# Patient Record
Sex: FEMALE
[De-identification: names, ages, dates, MRNs, and addresses within clinical notes are randomized per-mention and may not be internally consistent; named-entity substitution may affect disease eponyms.]

---

## 2024-09-10 PROBLEM — Z00.00 ENCOUNTER FOR PREVENTIVE HEALTH EXAMINATION: Status: ACTIVE | Noted: 2024-09-10

## 2024-09-13 ENCOUNTER — APPOINTMENT (OUTPATIENT)
Dept: ORTHOPEDIC SURGERY | Facility: CLINIC | Age: 64
End: 2024-09-13
Payer: MEDICARE

## 2024-09-13 DIAGNOSIS — M25.531 PAIN IN RIGHT WRIST: ICD-10-CM

## 2024-09-13 DIAGNOSIS — M25.532 PAIN IN RIGHT WRIST: ICD-10-CM

## 2024-09-13 PROCEDURE — 99204 OFFICE O/P NEW MOD 45 MIN: CPT

## 2024-09-13 NOTE — ASSESSMENT
[FreeTextEntry1] : My impression is that the patient has bilateral thumb basal joint arthritis. Initial treatment options were discussed. I explained that the condition is not curative and that initial treatment his pain to help control her symptoms. I discussed activity modification, diminishing activities that exacerbate her symptoms (i.e. opening tight jars and bottle caps), anti-inflammatory medication/ointment, thumb spica splint wear and corticosteroid injection. The patient refused to undergo a steroid shot today and refused all nonsurgical options, requesting and demanding surgical intervention.  I had a lengthy discussion with her explaining that this is not the proper route in regards to treating this condition, however, she disagreed.  The patient also refused an occupational therapy prescription, which I recommended.  She will plan to follow-up with a different provider given I cannot recommend surgical intervention at this time.

## 2024-09-13 NOTE — PHYSICAL EXAM
[de-identified] : Physical exam demonstrates the patient to be alert and oriented x 3 and capable of ambulation only with a rolling walker. The patient is well-developed and well-nourished in no apparent respiratory distress. The majority of the skin is intact bilaterally in the upper extremities without any bilateral elbow lymphadenopathy.  Evaluation of both elbows reveals full symmetric range of motion from full extension to 140 of flexion with full pronation and full supination.   The wrists have symmetric range of motion bilaterally. Tender over the thumb CMC joint bilaterally. There is no tenderness over the scaphoid, scapholunate, or lunotriquetral ligaments bilaterally. There is a negative Mullins's test bilaterally. There is no tenderness over the distal radial ulnar joint or TFCC and no evidence of instability bilaterally. There is no tenderness over the pisotriquetral joint, hamate hook, or CMC joints bilaterally. The patient is nontender over both scaphoids and anatomic snuffbox is bilaterally. There is no clubbing cyanosis or edema.  Full, symmetric digital ROM.   There is good capillary refill of the digits bilaterally. Sensation is intact to light touch bilaterally.

## 2024-09-13 NOTE — HISTORY OF PRESENT ILLNESS
[FreeTextEntry1] : 64-year-old female presenting for evaluation of bilateral hand pain for the last several months.  Patient states the pain is most pronounced in the radial side of both wrists. Patient states the wrist pains wake her up from sleep. Patient denies a pins and needles sensation in the wrists and hands.  She has not had treatment for her wrist and base of thumb pain.  Patient has had an MRI study performed in August 2024 which found:   1. A tear of triangular fibrocartilage near the ulnar attachment of the left wrist.  2. Moderate-to-severe osteoarthritic changes most prominent in the first carpal-metacarpal joint. 3. An intrasubstance tear of triangular fibrocartilage in the right wrist. 4. Severe osteoarthritic changes in the first carpometacarpal joint and moderate osteoarthritic changes in the remainder of the joint with multiple subcortical cysts in the carpal bones.  Patient also had an EMG performed in July 2024 which found:  1. No evidence of carpal tunnel syndrome. 2. Evidence of bilateral cervical radiculopathy.